# Patient Record
Sex: FEMALE | Race: WHITE | NOT HISPANIC OR LATINO | URBAN - METROPOLITAN AREA
[De-identification: names, ages, dates, MRNs, and addresses within clinical notes are randomized per-mention and may not be internally consistent; named-entity substitution may affect disease eponyms.]

---

## 2023-11-06 ENCOUNTER — EMERGENCY (EMERGENCY)
Facility: HOSPITAL | Age: 38
LOS: 1 days | Discharge: HOME | End: 2023-11-06
Attending: EMERGENCY MEDICINE | Admitting: EMERGENCY MEDICINE
Payer: COMMERCIAL

## 2023-11-06 VITALS
OXYGEN SATURATION: 100 % | WEIGHT: 139.99 LBS | TEMPERATURE: 97 F | SYSTOLIC BLOOD PRESSURE: 127 MMHG | RESPIRATION RATE: 17 BRPM | HEIGHT: 65 IN | DIASTOLIC BLOOD PRESSURE: 61 MMHG | HEART RATE: 83 BPM

## 2023-11-06 VITALS
OXYGEN SATURATION: 100 % | DIASTOLIC BLOOD PRESSURE: 71 MMHG | RESPIRATION RATE: 15 BRPM | SYSTOLIC BLOOD PRESSURE: 116 MMHG | HEART RATE: 78 BPM

## 2023-11-06 PROCEDURE — 73562 X-RAY EXAM OF KNEE 3: CPT

## 2023-11-06 PROCEDURE — 71250 CT THORAX DX C-: CPT | Mod: 26,MA

## 2023-11-06 PROCEDURE — 72170 X-RAY EXAM OF PELVIS: CPT | Mod: 26

## 2023-11-06 PROCEDURE — 71250 CT THORAX DX C-: CPT | Mod: MA

## 2023-11-06 PROCEDURE — 72170 X-RAY EXAM OF PELVIS: CPT

## 2023-11-06 PROCEDURE — 71046 X-RAY EXAM CHEST 2 VIEWS: CPT

## 2023-11-06 PROCEDURE — 99284 EMERGENCY DEPT VISIT MOD MDM: CPT

## 2023-11-06 PROCEDURE — 73562 X-RAY EXAM OF KNEE 3: CPT | Mod: 26,50

## 2023-11-06 PROCEDURE — 81025 URINE PREGNANCY TEST: CPT

## 2023-11-06 PROCEDURE — 71046 X-RAY EXAM CHEST 2 VIEWS: CPT | Mod: 26

## 2023-11-06 PROCEDURE — 99285 EMERGENCY DEPT VISIT HI MDM: CPT

## 2023-11-06 RX ORDER — LIDOCAINE 4 G/100G
1 CREAM TOPICAL ONCE
Refills: 0 | Status: COMPLETED | OUTPATIENT
Start: 2023-11-06 | End: 2023-11-06

## 2023-11-06 RX ORDER — ACETAMINOPHEN 500 MG
975 TABLET ORAL ONCE
Refills: 0 | Status: COMPLETED | OUTPATIENT
Start: 2023-11-06 | End: 2023-11-06

## 2023-11-06 RX ORDER — NOREPINEPHRINE BITARTRATE/D5W 8 MG/250ML
0.05 PLASTIC BAG, INJECTION (ML) INTRAVENOUS
Qty: 8 | Refills: 0 | Status: DISCONTINUED | OUTPATIENT
Start: 2023-11-06 | End: 2023-11-06

## 2023-11-06 RX ORDER — ESCITALOPRAM OXALATE 10 MG/1
0 TABLET, FILM COATED ORAL
Refills: 0 | DISCHARGE

## 2023-11-06 RX ORDER — IBUPROFEN 200 MG
400 TABLET ORAL ONCE
Refills: 0 | Status: COMPLETED | OUTPATIENT
Start: 2023-11-06 | End: 2023-11-06

## 2023-11-06 RX ADMIN — Medication 975 MILLIGRAM(S): at 09:34

## 2023-11-06 RX ADMIN — LIDOCAINE 1 PATCH: 4 CREAM TOPICAL at 09:34

## 2023-11-06 RX ADMIN — Medication 400 MILLIGRAM(S): at 09:33

## 2023-11-06 NOTE — ED PROVIDER NOTE - PROGRESS NOTE DETAILS
Bibi Shaw DO (PGY3): CT negative for rib fracture.  CT showing lung nodule, patient made aware and told to follow-up with her primary doctor.   X-ray negative for fracture.  Patient ambulating in the ED without assistance, pain improved after medication.  Patient remains with a GCS of 15 throughout ED stay, no neurologic deficits throughout ED stay. Pt made aware of imaging results, including incidental findings. Questions regarding their symptoms were addressed. Advised to follow up with pcp. Given strict return precautions. Pt verbalized understanding.

## 2023-11-06 NOTE — ED PROVIDER NOTE - WR ORDER DATE AND TIME 1
Dr. Salmon recommends Kerasal nail treatment. It is available over the counter at most stores and pharmacies or online. Directions: Follow the directions listed on the box.       Follow up:  As needed with one of our nurse practitioner's Judy or Tamika.    
06-Nov-2023 09:18
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
## 2023-11-06 NOTE — ED PROVIDER NOTE - PATIENT PORTAL LINK FT
You can access the FollowMyHealth Patient Portal offered by [facility] by registering at the following website: [email or web address]. By joining [facility]’s FollowMyHealth portal, you will also be able to view your health information using other applications (apps) compatible with our system.

## 2023-11-06 NOTE — ED PROVIDER NOTE - NS ED ATTENDING STATEMENT MOD
This was a shared visit with the ELIU. I reviewed and verified the documentation and independently performed the documented:

## 2023-11-06 NOTE — ED PROVIDER NOTE - WR ORDER NAME 1
Normal vision: sees adequately in most situations; can see medication labels, newsprint
Xray Pelvis AP only
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
## 2023-11-06 NOTE — ED PROVIDER NOTE - NSFOLLOWUPINSTRUCTIONS_ED_ALL_ED_FT
You may take acetaminophen 1000mg every 6 hours and ibuprofen 400mg every 6 hours as needed for pain. Return for worsening symptoms such as those listed below. See your primary doctor within 1-3 days .    It is common to have injuries to your face, neck, arms, and body after an injury like this. These injuries may include cuts, burns, bruises, and sore muscles. These injuries tend to feel worse for the first 24–48 hours but will start to feel better after that. Over the counter pain medications are effective in controlling pain.    SEEK IMMEDIATE MEDICAL CARE IF YOU HAVE ANY OF THE FOLLOWING SYMPTOMS: numbness, tingling, or weakness in your arms or legs, severe neck pain, changes in bowel or bladder control, shortness of breath, chest pain, blood in your urine/stool/vomit, headache, visual changes, lightheadedness/dizziness, or fainting. You may take acetaminophen 1000mg every 6 hours and ibuprofen 400mg every 6 hours as needed for pain. Return for worsening symptoms such as those listed below. See your primary doctor within 1-3 days .    It is common to have injuries to your face, neck, arms, and body after an injury like this. These injuries may include cuts, burns, bruises, and sore muscles. These injuries tend to feel worse for the first 24–48 hours but will start to feel better after that. Over the counter pain medications are effective in controlling pain.    SEEK IMMEDIATE MEDICAL CARE IF YOU HAVE ANY OF THE FOLLOWING SYMPTOMS: numbness, tingling, or weakness in your arms or legs, severe neck pain, changes in bowel or bladder control, shortness of breath, chest pain, vomiting, confusion, blood in your urine/stool/vomit, headache, visual changes, lightheadedness/dizziness, or fainting.

## 2023-11-06 NOTE — ED PROVIDER NOTE - PHYSICAL EXAMINATION
GENERAL: Awake. Alert. NAD. Well nourished.  HEENT: NC/AT, PERRL, EOMI, Conjunctiva pink, no scleral icterus. Airway patent.   LUNGS: CTAB. No wheezes or rales noted.  CARDIAC: Chest non-tender to palpation. RRR.  ABDOMEN: No masses noted. Soft, NT, ND, no rebound, no guarding.  BACK: No midline spinal tenderness  EXT: No edema, no calf tenderness, distal pulses 2+ bilaterally  NEURO: A&Ox3. Moving all extremities. Sensation and strength intact throughout. No focal deficits.   SKIN: Warm and dry. Abrasion to abdomen, no ecchymosis noted throughout. Abrasion to b/l knees and b/l elbows.  PSYCH: Normal affect. GENERAL: Awake. Alert. NAD. Well nourished.  HEENT: NC/AT, PERRL, EOMI, Conjunctiva pink, no scleral icterus. Airway patent. Abrasion to the lower lip, no laceration or active bleeding noted.  LUNGS: CTAB. No wheezes or rales noted.  CARDIAC: Chest non-tender to palpation. RRR.  ABDOMEN: No masses noted. Soft, NT, ND, no rebound, no guarding.  BACK: No midline spinal tenderness  EXT: No edema, no calf tenderness, distal pulses 2+ bilaterally  NEURO: A&Ox3. Moving all extremities. Sensation and strength intact throughout. No focal deficits.   SKIN: Warm and dry. Abrasion to abdomen, no ecchymosis noted throughout. Abrasion to b/l knees and b/l elbows. Abrasions to the R dorsal hand. No lacerations noted.  PSYCH: Normal affect.

## 2023-11-08 ENCOUNTER — EMERGENCY (EMERGENCY)
Facility: HOSPITAL | Age: 38
LOS: 1 days | Discharge: HOME | End: 2023-11-08
Attending: STUDENT IN AN ORGANIZED HEALTH CARE EDUCATION/TRAINING PROGRAM | Admitting: EMERGENCY MEDICINE
Payer: COMMERCIAL

## 2023-11-08 VITALS
HEART RATE: 84 BPM | TEMPERATURE: 98 F | WEIGHT: 139.99 LBS | RESPIRATION RATE: 18 BRPM | DIASTOLIC BLOOD PRESSURE: 60 MMHG | OXYGEN SATURATION: 98 % | HEIGHT: 65 IN | SYSTOLIC BLOOD PRESSURE: 103 MMHG

## 2023-11-08 PROBLEM — F41.9 ANXIETY DISORDER, UNSPECIFIED: Chronic | Status: ACTIVE | Noted: 2023-11-06

## 2023-11-08 PROCEDURE — 99283 EMERGENCY DEPT VISIT LOW MDM: CPT

## 2023-11-08 PROCEDURE — 99284 EMERGENCY DEPT VISIT MOD MDM: CPT

## 2023-11-08 RX ORDER — OXYCODONE HYDROCHLORIDE 5 MG/1
1 TABLET ORAL
Qty: 3 | Refills: 0
Start: 2023-11-08 | End: 2023-11-09

## 2023-11-08 RX ORDER — OXYCODONE HYDROCHLORIDE 5 MG/1
2.5 TABLET ORAL ONCE
Refills: 0 | Status: DISCONTINUED | OUTPATIENT
Start: 2023-11-08 | End: 2023-11-08

## 2023-11-08 RX ORDER — IBUPROFEN 200 MG
600 TABLET ORAL ONCE
Refills: 0 | Status: COMPLETED | OUTPATIENT
Start: 2023-11-08 | End: 2023-11-08

## 2023-11-08 RX ADMIN — Medication 600 MILLIGRAM(S): at 14:05

## 2023-11-08 RX ADMIN — OXYCODONE HYDROCHLORIDE 2.5 MILLIGRAM(S): 5 TABLET ORAL at 14:05

## 2023-11-08 NOTE — ED PROVIDER NOTE - PATIENT PORTAL LINK FT
You can access the FollowMyHealth Patient Portal offered by [facility] by registering at the following website: [email or web address]. By joining [facility]’s FollowMyHealth portal, you will also be able to view your health information using other applications (apps) compatible with our system.
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
## 2023-11-08 NOTE — ED ADULT NURSE NOTE - NSFALLUNIVINTERV_ED_ALL_ED
Bed/Stretcher in lowest position, wheels locked, appropriate side rails in place/Call bell, personal items and telephone in reach/Instruct patient to call for assistance before getting out of bed/chair/stretcher/Non-slip footwear applied when patient is off stretcher/[place] to call system/Physically safe environment - no spills, clutter or unnecessary equipment/Purposeful proactive rounding/Room/bathroom lighting operational, light cord in reach
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
## 2023-11-08 NOTE — ED ADULT NURSE NOTE - NSSEPSISSUSPECTED_ED_A_ED
Left vm for patient to call and r/s procedure if still necessary.
Pt wife called to cancel procedure on 10/06/2022 due to work conflict, unable to reach office, please call wife, pt out of town [phone] psc/sc
No
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
